# Patient Record
Sex: FEMALE | Race: WHITE | NOT HISPANIC OR LATINO | URBAN - METROPOLITAN AREA
[De-identification: names, ages, dates, MRNs, and addresses within clinical notes are randomized per-mention and may not be internally consistent; named-entity substitution may affect disease eponyms.]

---

## 2017-11-02 ENCOUNTER — OFFICE (OUTPATIENT)
Dept: URBAN - METROPOLITAN AREA CLINIC 78 | Facility: CLINIC | Age: 82
End: 2017-11-02

## 2017-11-02 VITALS
HEIGHT: 66 IN | TEMPERATURE: 97.3 F | DIASTOLIC BLOOD PRESSURE: 106 MMHG | WEIGHT: 137 LBS | SYSTOLIC BLOOD PRESSURE: 163 MMHG | HEART RATE: 71 BPM

## 2017-11-02 DIAGNOSIS — K26.1 ACUTE DUODENAL ULCER WITH PERFORATION: ICD-10-CM

## 2017-11-02 DIAGNOSIS — B96.81 HELICOBACTER PYLORI [H. PYLORI] AS THE CAUSE OF DISEASES CLA: ICD-10-CM

## 2017-11-02 DIAGNOSIS — Z86.010 PERSONAL HISTORY OF COLONIC POLYPS: ICD-10-CM

## 2017-11-02 PROCEDURE — 99214 OFFICE O/P EST MOD 30 MIN: CPT

## 2017-11-02 RX ORDER — PANTOPRAZOLE SODIUM 40 MG/1
TABLET, DELAYED RELEASE ORAL
Qty: 90 | Refills: 3 | Status: ACTIVE
Start: 2017-11-02

## 2018-05-15 ENCOUNTER — OFFICE (OUTPATIENT)
Dept: URBAN - METROPOLITAN AREA CLINIC 78 | Facility: CLINIC | Age: 83
End: 2018-05-15

## 2018-05-15 VITALS
SYSTOLIC BLOOD PRESSURE: 151 MMHG | DIASTOLIC BLOOD PRESSURE: 98 MMHG | WEIGHT: 146 LBS | HEART RATE: 75 BPM | HEIGHT: 66 IN | TEMPERATURE: 98.1 F

## 2018-05-15 DIAGNOSIS — Z87.11 PERSONAL HISTORY OF PEPTIC ULCER DISEASE: ICD-10-CM

## 2018-05-15 DIAGNOSIS — K29.60 OTHER GASTRITIS WITHOUT BLEEDING: ICD-10-CM

## 2018-05-15 PROCEDURE — 99214 OFFICE O/P EST MOD 30 MIN: CPT

## 2018-05-15 NOTE — SERVICEHPINOTES
89 yo female presents with staff from assisted living facility for f/u h/o duodenal ulcer. She is here for a 6-month f/u appointment recommended by Dr. Eugene who last saw patient here in November. Patient has h/o contained perforated duodenal ulcer in June 2017. It was treated conservatively and she has continued on pantoprazole 40 mg daily and has been doing well. She is s/p H pylori treatment in past with confirmed eradication on breath testing - this was in 2016 was re-treated based only on serology at time of ulcer in 2017 f/u breath test again negative after that. Her weight is up 9 pounds since last visit. She is eating well and denies any black stools, N/V, GERD, abdominal pain, or any other concerns.   BR